# Patient Record
Sex: FEMALE | Race: ASIAN | URBAN - METROPOLITAN AREA
[De-identification: names, ages, dates, MRNs, and addresses within clinical notes are randomized per-mention and may not be internally consistent; named-entity substitution may affect disease eponyms.]

---

## 2017-11-05 NOTE — PROGRESS NOTES
PRE-VISIT PLANNING:     Future Appointments  Date Time Provider Malcom Mosley   2017 10:30 AM Coretta Garcia MD [address] (PCP is Cathi Hull MD) is scheduled for an office visit with Coretta Garcia MD on 2017 for new patient visit. Encounter opened prior to visit to complete pre-visit planning, update and review pertinent sections in the chart. Omid Landers did not return matching chart. Rooming Nurse Items:  -- Release for previous PCP records (HM, immunization records and last office note)  -- Release for OB/GYN records (Pap smear, mammo, DEXA)    Pending items for provider to review with patient:  -- Fasting labs  Health Maintenance Due   Topic Date Due    PAP AKA CERVICAL CYTOLOGY  1989            New Patient Visit - Internal [address] at [facility]  [address] [facility], [facility], [address]  Phone [phone]; Fax [phone]    Date of Service:  2017  Patient's Name: Elena Welch   Patient's :  1970   Patient's Age:  48 y.o. Patient's Gender:  female     Subjective/Discussion:     Chief Complaint   Patient presents with   [address]gur 74 presented as a new patient today. No previous PCP. Has been seeing OB/GYN Dr. Chely Ruiz regularly. C/o RUQ pain in abdominal wall, occurs only with ab workouts, can stretch out and pain will resolve. No change in bowel habits associated with pain. Has mild constipation, but bowels are moving regularly. Prefers natural remedies to medication, but hasn't required any supplements or teas for constipation recently. Does also c/o frequent belching/gas with some abdominal bloating, not associated with any trigger. No nausea, vomiting, diarrhea. No recent antibiotic exposures or illnesses. In good general health. No medical dx, no daily meds.   Occasionally uses PRN Tylenol from [facility] (Panadol brand) and PRN Fexofenadine if she has skin itching or hives (unclear trigger, but she thinks an ingredient in soda/Coca [employer]a is a trigger). Past Medical History reviewed and annotated:   Past Medical History:   Diagnosis Date    Abdominal bloating 11/6/2017    Abdominal wall pain in right upper quadrant 11/6/2017    Constipation 11/6/2017    Hives and itching     Coca [employer]a triggers hives, elastic in clothing will also cause itching - can't tolerate benadryl, uses fexofenadine    Influenza vaccination declined by patient 11/6/2017       REVIEW OF SYSTEMS       Complete review of systems negative except as noted in HPI. Physical Exam:     VS:    Visit Vitals    /60 (BP 1 Location: Right arm, BP Patient Position: Sitting)    Pulse (!) 59    Temp 97.4 °F (36.3 °C) (Oral)    Resp 16    Ht 5' 6\" (1.676 m)    Wt 130 lb (59 kg)    LMP 10/16/2017    SpO2 98%    BMI 20.98 kg/m2        General:   Pleasant, well-appearing female seated comfortably, slender,       Well-nourished, well-groomed, conversant, alert, in no acute distress.      Psych:  Affect pleasnat, appropriate,  interactive, facies and mood are congruent,     behavior and conversation are appropriate, thought process linear and logical     Memory appears to be normal throughout interview  Head:  Normocephalic, atraumatic  Ears:  External ears WNL, no pain with manipulation of pinna or palpation of mastoids;      EACs patent; TMs with no bulging, or erythema, no medial effusions present  Eyes:  EOMI, PERRL, no pain with eye movements    No conjunctival injection, abnormal tearing, discharge, chemosis  Mouth:  MMM without erythema, membranes, exudates, ulcerations or petechiae; posterior o/p WNL  Nose:  External nasal structures WNL, nares patent b/l  Neck:  Neck supple with normal ROM for age    No thyromegaly or nodules, no LAD  Cardiovasc:   Regular rate and rhythm, no murmurs, no rubs, no gallops  Pulmonary:   Clear breath sounds bilaterally, good air movement,    No wheezing, no rales, no rhonchi, normal respiratory effort  Abdomen:   Abdomen soft, nontender, nondistended, NABS, no masses  Extremities:   No pitting dependent edema    No tenderness with palpation of calves    Warm and well-perfused at distal extremities  Neuro:   Alert, conversant, following commands, no focal deficits. Ambulating with narrow based, stable gait without use of assistive device  Skin:    Warm, dry, normal pallor, no rashes noted. Assessment/Plan:        Diagnoses and all orders for this visit:    1. Constipation, unspecified constipation type    2. Abdominal bloating    3. Abdominal wall pain in right upper quadrant    4. Screening for endocrine, metabolic, and immunity disorder  -     CBC WITH AUTOMATED DIFF; Future  -     METABOLIC PANEL, COMPREHENSIVE; Future  -     HEMOGLOBIN A1C WITH EAG; Future  -     LIPID PANEL; Future  -     TSH AND FREE T4; Future  -     URINALYSIS W/ RFLX MICROSCOPIC; Future  -     VITAMIN D, 25 HYDROXY; Future    5. Screening for diabetes mellitus  -     HEMOGLOBIN A1C WITH EAG; Future    6. Screening, lipid  -     LIPID PANEL; Future    7. Screening for thyroid disorder  -     TSH AND FREE T4; Future    8. Screening for blood or protein in urine  -     URINALYSIS W/ RFLX MICROSCOPIC; Future    9. Encounter for vitamin deficiency screening  -     VITAMIN D, 25 HYDROXY; Future    10. Influenza vaccination declined by patient        Sree Valle was seen today as a new patient to establish care. Records from previous providers requested. Fasting labs today. Your right upper abdominal pain sounds muscular - try using a foam roller for 2-3 minutes to relax your abdominal muscles before a workout. If you are still having pain, please call and let me know.     Your bloating and belching may be from a change in your gut cali (the bacteria that live in your intestines and help you digest food). Take a probiotic of your choice for 2 months (once daily) and if not improving, call my office. Call and request an earlier appointment if you have any new questions or concerns. Recommend flu shot and tetanus booster/TDAP, declined by patient    Follow up with Argenis Lam MD yearly for preventive visit (30m). Call and request an earlier appointment for new questions or concerns. The patient states understanding and agrees with the treatment plan. Argenis Lam MD - Internal Medicine  2017, 1:10 PM  [facility]    Patient Care Team:  Yudith Chauhan MD as PCP - General  Anaid Kay MD as Consulting Provider (Obstetrics & Gynecology)     Medications:     Current Outpatient Prescriptions   Medication Sig    ACETAMINOPHEN (PANADOL EXTRA STRENGTH PO) Take  by mouth. As needed    fexofenadine (ALLEGRA) 180 mg tablet Take 180 mg by mouth daily as needed for Itching. No current facility-administered medications for this visit. Additional History:     Past Surgical History:   Procedure Laterality Date    HX  SECTION       Social History     Social History    Marital status:      Spouse name: N/A    Number of children: N/A    Years of education: N/A     Social History Main Topics    Smoking status: Former Smoker    Smokeless tobacco: Never Used    Alcohol use Yes      Comment: rarely    Drug use: No    Sexual activity: Yes     Partners: Male     Other Topics Concern    None     Social History Narrative    2017:  [place], grew up in [place] and moved to the [address] [place] [address] about 7 years ago. Had one child via . Works as a massage therapist/energy healer.        Family History   Problem Relation Age of Onset    Hypertension Father      Allergies   Allergen Reactions    Cephalosporins Anaphylaxis    Codeine Shortness of Breath     Shaking    Pcn [Penicillins] Unknown (comments)  Opioids - Morphine Analogues Anxiety       Problem List:      Patient Active Problem List   Diagnosis Code    Influenza vaccination declined by patient Z28.21    Abdominal wall pain in right upper quadrant R10.11    Abdominal bloating R14.0    Constipation K59.00            This document may have been created with the aid of dictation software. In spite of review and editing, text may contain errors, particularly phonetic errors.

## 2017-11-06 ENCOUNTER — HOSPITAL ENCOUNTER (OUTPATIENT)
Dept: LAB | Age: 47
Discharge: HOME | End: 2017-11-06

## 2017-11-06 ENCOUNTER — OFFICE VISIT (OUTPATIENT)
Dept: FAMILY MEDICINE CLINIC | Age: 47
End: 2017-11-06

## 2017-11-06 VITALS
TEMPERATURE: 97.4 F | BODY MASS INDEX: 20.89 KG/M2 | WEIGHT: 130 LBS | SYSTOLIC BLOOD PRESSURE: 112 MMHG | OXYGEN SATURATION: 98 % | HEIGHT: 66 IN | DIASTOLIC BLOOD PRESSURE: 60 MMHG | RESPIRATION RATE: 16 BRPM | HEART RATE: 59 BPM

## 2017-11-06 DIAGNOSIS — R14.0 ABDOMINAL BLOATING: ICD-10-CM

## 2017-11-06 DIAGNOSIS — K59.00 CONSTIPATION, UNSPECIFIED CONSTIPATION TYPE: Primary | ICD-10-CM

## 2017-11-06 DIAGNOSIS — Z13.29 SCREENING FOR THYROID DISORDER: ICD-10-CM

## 2017-11-06 DIAGNOSIS — Z13.1 SCREENING FOR DIABETES MELLITUS: ICD-10-CM

## 2017-11-06 DIAGNOSIS — Z13.29 SCREENING FOR ENDOCRINE, METABOLIC, AND IMMUNITY DISORDER: ICD-10-CM

## 2017-11-06 DIAGNOSIS — Z13.21 ENCOUNTER FOR VITAMIN DEFICIENCY SCREENING: ICD-10-CM

## 2017-11-06 DIAGNOSIS — Z28.21 INFLUENZA VACCINATION DECLINED BY PATIENT: ICD-10-CM

## 2017-11-06 DIAGNOSIS — Z13.0 SCREENING FOR ENDOCRINE, METABOLIC, AND IMMUNITY DISORDER: ICD-10-CM

## 2017-11-06 DIAGNOSIS — Z13.228 SCREENING FOR ENDOCRINE, METABOLIC, AND IMMUNITY DISORDER: ICD-10-CM

## 2017-11-06 DIAGNOSIS — R10.11 ABDOMINAL WALL PAIN IN RIGHT UPPER QUADRANT: ICD-10-CM

## 2017-11-06 DIAGNOSIS — Z13.89 SCREENING FOR BLOOD OR PROTEIN IN URINE: ICD-10-CM

## 2017-11-06 DIAGNOSIS — Z13.220 SCREENING, LIPID: ICD-10-CM

## 2017-11-06 PROCEDURE — 99001 SPECIMEN HANDLING PT-LAB: CPT | Performed by: INTERNAL MEDICINE

## 2017-11-06 RX ORDER — MINERAL OIL
180 ENEMA (ML) RECTAL
COMMUNITY

## 2017-11-06 NOTE — PATIENT INSTRUCTIONS
No results found for any previous visit. AFTER VISIT INSTRUCTIONS       Fasting labs today. Your right upper abdominal pain sounds muscular - try using a foam roller for 2-3 minutes to relax your abdominal muscles before a workout. If you are still having pain, please call and let me know. Your bloating and belching may be from a change in your gut cali (the bacteria that live in your intestines and help you digest food). Take a probiotic of your choice for 2 months (once daily) and if not improving, call my office. Call and request an earlier appointment if you have any new questions or concerns. LAB HOURS AT [facility]     Monday-Friday 7a-3p  Closed on holidays    TESTING RESULTS     Results of tests performed in this office will be viewable through [employer] in 10-14 days. Please be patient when awaiting routine lab testing results. Testing completed outside of the [employer] [facility] will not be viewable in [employer]. If you need assistance with accessing your [email or web address] account, you can call the [facility] [employer] at #[phone]. STAY UP TO DATE WITH YOUR PREVENTIVE HEALTH   Please review the following recommendations and if you are not sure that your healthcare is up to date, ask your provider at your next scheduled office visit. -- Yearly preventive visits (sometimes called \"physicals\") are recommended for all adults. Medicare and Medicaid do not pay for physicals. Medicare covers a yearly wellness visit that differs in many ways from a traditional physical, but is an opportunity to make sure you are maximizing the preventive services that will be covered by Medicare. Each insurance carrier will have different regulations about what services may be covered, so be sure to talk with your insurance provider before scheduling a visit.      -- Colon cancer screening is recommended for all patients 50 and older with either colonoscopy (interval will vary depending on results) or yearly stool testing.      -- Mammogram is recommended every 2 years for women ages 36 to 76. -- Pap smear is recommended every 3-5 years for women aged 24 to 72, unless your cervix has been surgically removed for benign reasons. -- Flu shot is recommended every fall for adults of all ages. -- Prevnar 15 is recommended at the age of 72 for all adults. -- Pneumovax is recommended one year after Prevnar 13 for all adults, but earlier if you have a history of chronic health problems (including diabetes and asthma) or smoking. -- Tetanus boosters are recommended every 10 years for adults of all ages. Medicare and Medicaid do not cover tetanus as a preventive booster, but will pay for patients to receive a booster in the event of certain injuries. MEDICATION REFILLS      -- Controlled substance refills must be obtained during a scheduled office visit with the provider. -- All other medication refills are to be requested by calling your pharmacy during regular office hours. Allow at least 2 business days for processing. Refills will not be provided by the after hours answering service/on call provider. HOLIDAY CLOSURES:     The office is closed on six major holidays each year:  New Year's Day, July 4th, Memorial Day, Labor Day, Thanksgiving, and [place] Day. Lab and X-ray services are not available on those days.

## 2017-11-06 NOTE — MR AVS SNAPSHOT
Visit Information Date & Time Provider Department Dept. Phone Encounter #  
 11/6/2017 10:30 AM Celena Stephenson MD [employer] [phone] [id number] Upcoming Health Maintenance Date Due DTaP/Tdap/Td series (1 - Tdap) 11/30/1989 PAP AKA CERVICAL CYTOLOGY 11/30/1989 INFLUENZA AGE 9 TO ADULT 8/1/2017 Allergies as of 11/6/2017  Review Complete On: 11/6/2017 By: Celena Stephenson MD  
  
 Severity Noted Reaction Type Reactions Cephalosporins High 11/06/2017    Anaphylaxis Codeine  11/06/2017    Shortness of Breath Shaking Pcn [Penicillins]  11/06/2017    Unknown (comments) Opioids - Morphine Analogues Low 11/06/2017    Anxiety Current Immunizations  Never Reviewed No immunizations on file. Not reviewed this visit You Were Diagnosed With   
  
 Codes Comments Constipation, unspecified constipation type    -  Primary ICD-10-CM: K59.00 ICD-9-CM: 564.00 Abdominal bloating     ICD-10-CM: R14.0 ICD-9-CM: 219. 3 Right upper quadrant abdominal pain     ICD-10-CM: R10.11 ICD-9-CM: 789.01 Screening for endocrine, metabolic, and immunity disorder     ICD-10-CM: Z13.29, Z13.228, Z13.0 ICD-9-CM: V77.99 Screening for diabetes mellitus     ICD-10-CM: Z13.1 ICD-9-CM: V77.1 Screening, lipid     ICD-10-CM: C77.549 ICD-9-CM: V77.91 Screening for thyroid disorder     ICD-10-CM: Z13.29 ICD-9-CM: V77.0 Screening for blood or protein in urine     ICD-10-CM: Z13.89 ICD-9-CM: V82.9 Encounter for vitamin deficiency screening     ICD-10-CM: Z13.21 ICD-9-CM: V77.99 Vitals BP Pulse Temp Resp Height(growth percentile) Weight(growth percentile) 112/60 (BP 1 Location: Right arm, BP Patient Position: Sitting) (!) 59 97.4 °F (36.3 °C) (Oral) 16 5' 6\" (1.676 m) 130 lb (59 kg) LMP SpO2 BMI OB Status Smoking Status 10/16/2017 98% 20.98 kg/m2 Having regular periods Former Smoker Vitals History BMI and BSA Data Body Mass Index Body Surface Area  
 20.98 kg/m 2 1.66 m 2 Your Updated Medication List  
  
   
This list is accurate as of: 11/6/17 11:12 AM.  Always use your most recent med list.  
  
  
  
  
 fexofenadine 180 mg tablet Commonly known as:  Wolfgang Flax Take 180 mg by mouth daily as needed for Itching. PANADOL EXTRA STRENGTH PO Take  by mouth. As needed To-Do List   
 11/06/2017 Lab:  CBC WITH AUTOMATED DIFF   
  
 11/06/2017 Lab:  HEMOGLOBIN A1C WITH EAG   
  
 11/06/2017 Lab:  LIPID PANEL   
  
 11/06/2017 Lab:  METABOLIC PANEL, COMPREHENSIVE   
  
 11/06/2017 Lab:  TSH AND FREE T4   
  
 11/06/2017 Lab:  URINALYSIS W/ RFLX MICROSCOPIC   
  
 11/06/2017 Lab:  VITAMIN D, 25 HYDROXY Patient Instructions No results found for any previous visit. AFTER VISIT INSTRUCTIONS Fasting labs today. Your right upper abdominal pain sounds muscular - try using a foam roller for 2-3 minutes to relax your abdominal muscles before a workout. If you are still having pain, please call and let me know. Your bloating and belching may be from a change in your gut cali (the bacteria that live in your intestines and help you digest food). Take a probiotic of your choice for 2 months (once daily) and if not improving, call my office. Call and request an earlier appointment if you have any new questions or concerns. LAB HOURS AT [facility] Monday-Friday 7a-3p Closed on holidays TESTING RESULTS Results of tests performed in this office will be viewable through [facility] in 10-14 days. Please be patient when awaiting routine lab testing results. Testing completed outside of the [employer] [facility] [facility] will not be viewable in [facility]. If you need assistance with accessing your [employer] account, you can call the [address] [place] [address] at #[phone]. STAY UP  [address] Please review the following recommendations and if you are not sure that your healthcare is up to date, ask your provider at your next scheduled office visit. -- Yearly preventive visits (sometimes called \"physicals\") are recommended for all adults. Medicare and Medicaid do not pay for physicals. Medicare covers a yearly wellness visit that differs in many ways from a traditional physical, but is an opportunity to make sure you are maximizing the preventive services that will be covered by Medicare. Each insurance carrier will have different regulations about what services may be covered, so be sure to talk with your insurance provider before scheduling a visit. -- Colon cancer screening is recommended for all patients 48 and older with either colonoscopy (interval will vary depending on results) or yearly stool testing.   
 
-- Mammogram is recommended every 2 years for women ages 36 to 76. -- Pap smear is recommended every 3-5 years for women aged 24 to 72, unless your cervix has been surgically removed for benign reasons. -- Flu shot is recommended every fall for adults of all ages. -- Prevnar 15 is recommended at the age of 72 for all adults. -- Pneumovax is recommended one year after Prevnar 13 for all adults, but earlier if you have a history of chronic health problems (including diabetes and asthma) or smoking. -- Tetanus boosters are recommended every 10 years for adults of all ages. Medicare and Medicaid do not cover tetanus as a preventive booster, but will pay for patients to receive a booster in the event of certain injuries. MEDICATION REFILLS   
 
-- Controlled substance refills must be obtained during a scheduled office visit with the provider. -- All other medication refills are to be requested by calling your pharmacy during regular office hours.   Allow at least 2 business days for processing. Refills will not be provided by the after hours answering service/on call provider. HOLIDAY CLOSURES:  
 
The office is closed on six major holidays each year:  New Year's Day, July 4th, Memorial Day, Labor Day, Thanksgiving, and [place] Day. Lab and X-ray services are not available on those days. Introducing [facility]! [employer] introduces [employer] patient portal. Now you can access parts of your medical record, email your doctor's office, and request medication refills online. 1. In your internet browser, go to [email or web address]://[employer]. [employer] 2. Click on the First Time User? Click Here link in the Sign In box. You will see the New Member Sign Up page. 3. Enter your [employer] Access Code exactly as it appears below. You will not need to use this code after youve completed the sign-up process. If you do not sign up before the expiration date, you must request a new code. · [employer] Access Code: [email or web address] Expires: 2/4/2018 11:12 AM 
 
4. Enter the last four digits of your Social Security Number (xxxx) and Date of Birth (mm/dd/yyyy) as indicated and click Submit. You will be taken to the next sign-up page. 5. Create a [employer] ID. This will be your [employer] login ID and cannot be changed, so think of one that is secure and easy to remember. 6. Create a [employer] password. You can change your password at any time. 7. Enter your Password Reset Question and Answer. This can be used at a later time if you forget your password. 8. Enter your e-mail address. You will receive e-mail notification when new information is available in [address] 9. Click Sign Up. You can now view and download portions of your medical record. 10. Click the Download Summary menu link to download a portable copy of your medical information.  
 
If you have questions, please visit the Frequently Asked Questions section of the [employer]. Remember, [employer] is NOT to be used for urgent needs. For medical emergencies, dial 911. Now available from your iPhone and Android! Please provide this summary of care documentation to your next provider. Your primary care clinician is listed as Amy Carrillo. If you have any questions after today's visit, please call [phone].

## 2017-11-06 NOTE — PROGRESS NOTES
Albaro Myers is a 48 y.o. female (: 1970) presenting to address:    Chief Complaint   Patient presents with    Establish Care       Vitals:    17 1034   BP: 112/60   Pulse: (!) 59   Resp: 16   Temp: 97.4 °F (36.3 °C)   TempSrc: Oral   SpO2: 98%   Weight: 130 lb (59 kg)   Height: 5' 6\" (1.676 m)   PainSc:   0 - No pain   LMP: 10/16/2017       Learning Assessment:     Learning Assessment 2017   PRIMARY LEARNER Patient   HIGHEST LEVEL OF EDUCATION - PRIMARY LEARNER  SOME COLLEGE   BARRIERS PRIMARY LEARNER NONE   CO-LEARNER CAREGIVER No   PRIMARY LANGUAGE ENGLISH    NEED No   LEARNER PREFERENCE PRIMARY READING   LEARNING SPECIAL TOPICS none   ANSWERED BY patient   RELATIONSHIP SELF   ASSESSMENT COMMENT n/a     Depression Screening:     PHQ over the last two weeks 2017   Little interest or pleasure in doing things Not at all   Feeling down, depressed or hopeless Not at all   Total Score PHQ 2 0     Fall Risk Assessment:     Fall Risk Assessment, last 12 mths 2017   Able to walk? Yes   Fall in past 12 months? No     Abuse Screening:     Abuse Screening Questionnaire 2017   Do you ever feel afraid of your partner? N   Are you in a relationship with someone who physically or mentally threatens you? N   Is it safe for you to go home? Y       Advanced Directive:   1. Do you have an Advanced Directive? NO    2. Would you like information on Advanced Directives?  YES

## 2017-11-07 DIAGNOSIS — E55.9 VITAMIN D INSUFFICIENCY: Primary | ICD-10-CM

## 2017-11-07 DIAGNOSIS — E78.1 HYPERTRIGLYCERIDEMIA: ICD-10-CM

## 2017-11-07 LAB
25(OH)D3+25(OH)D2 SERPL-MCNC: 29.7 NG/ML (ref 30–100)
ALBUMIN SERPL-MCNC: 4.5 G/DL (ref 3.5–5.5)
ALBUMIN/GLOB SERPL: 1.4 {RATIO} (ref 1.2–2.2)
ALP SERPL-CCNC: 52 IU/L (ref 39–117)
ALT SERPL-CCNC: 27 IU/L (ref 0–32)
APPEARANCE UR: CLEAR
AST SERPL-CCNC: 20 IU/L (ref 0–40)
BASOPHILS # BLD AUTO: 0 X10E3/UL (ref 0–0.2)
BASOPHILS NFR BLD AUTO: 0 %
BILIRUB SERPL-MCNC: 0.7 MG/DL (ref 0–1.2)
BILIRUB UR QL STRIP: NEGATIVE
BUN SERPL-MCNC: 13 MG/DL (ref 6–24)
BUN/CREAT SERPL: 19 (ref 9–23)
CALCIUM SERPL-MCNC: 9.4 MG/DL (ref 8.7–10.2)
CHLORIDE SERPL-SCNC: 95 MMOL/L (ref 96–106)
CHOLEST SERPL-MCNC: 210 MG/DL (ref 100–199)
CO2 SERPL-SCNC: 24 MMOL/L (ref 18–29)
COLOR UR: YELLOW
CREAT SERPL-MCNC: 0.68 MG/DL (ref 0.57–1)
EOSINOPHIL # BLD AUTO: 0.1 X10E3/UL (ref 0–0.4)
EOSINOPHIL NFR BLD AUTO: 2 %
ERYTHROCYTE [DISTWIDTH] IN BLOOD BY AUTOMATED COUNT: 13.5 % (ref 12.3–15.4)
EST. AVERAGE GLUCOSE BLD GHB EST-MCNC: 111 MG/DL
GFR SERPLBLD CREATININE-BSD FMLA CKD-EPI: 104 ML/MIN/1.73
GFR SERPLBLD CREATININE-BSD FMLA CKD-EPI: 120 ML/MIN/1.73
GLOBULIN SER CALC-MCNC: 3.2 G/DL (ref 1.5–4.5)
GLUCOSE SERPL-MCNC: 93 MG/DL (ref 65–99)
GLUCOSE UR QL: NEGATIVE
HBA1C MFR BLD: 5.5 % (ref 4.8–5.6)
HCT VFR BLD AUTO: 41.3 % (ref 34–46.6)
HDLC SERPL-MCNC: 39 MG/DL
HGB BLD-MCNC: 13.6 G/DL (ref 11.1–15.9)
HGB UR QL STRIP: NEGATIVE
IMM GRANULOCYTES # BLD: 0 X10E3/UL (ref 0–0.1)
IMM GRANULOCYTES NFR BLD: 0 %
INTERPRETATION, 910389: NORMAL
KETONES UR QL STRIP: NEGATIVE
LDLC SERPL CALC-MCNC: ABNORMAL MG/DL (ref 0–99)
LEUKOCYTE ESTERASE UR QL STRIP: NEGATIVE
LYMPHOCYTES # BLD AUTO: 1.2 X10E3/UL (ref 0.7–3.1)
LYMPHOCYTES NFR BLD AUTO: 25 %
MCH RBC QN AUTO: 30 PG (ref 26.6–33)
MCHC RBC AUTO-ENTMCNC: 32.9 G/DL (ref 31.5–35.7)
MCV RBC AUTO: 91 FL (ref 79–97)
MICRO URNS: NORMAL
MONOCYTES # BLD AUTO: 0.4 X10E3/UL (ref 0.1–0.9)
MONOCYTES NFR BLD AUTO: 8 %
NEUTROPHILS # BLD AUTO: 3.2 X10E3/UL (ref 1.4–7)
NEUTROPHILS NFR BLD AUTO: 65 %
NITRITE UR QL STRIP: NEGATIVE
PDF IMAGE, 910387: NORMAL
PH UR STRIP: 5.5 [PH] (ref 5–7.5)
PLATELET # BLD AUTO: 212 X10E3/UL (ref 150–379)
POTASSIUM SERPL-SCNC: 4.3 MMOL/L (ref 3.5–5.2)
PROT SERPL-MCNC: 7.7 G/DL (ref 6–8.5)
PROT UR QL STRIP: NEGATIVE
RBC # BLD AUTO: 4.53 X10E6/UL (ref 3.77–5.28)
SODIUM SERPL-SCNC: 138 MMOL/L (ref 134–144)
SP GR UR: 1.01 (ref 1–1.03)
T4 FREE SERPL-MCNC: 1.1 NG/DL (ref 0.82–1.77)
TRIGL SERPL-MCNC: 513 MG/DL (ref 0–149)
TSH SERPL DL<=0.005 MIU/L-ACNC: 3.72 UIU/ML (ref 0.45–4.5)
UROBILINOGEN UR STRIP-MCNC: 0.2 MG/DL (ref 0.2–1)
VLDLC SERPL CALC-MCNC: ABNORMAL MG/DL (ref 5–40)
WBC # BLD AUTO: 4.9 X10E3/UL (ref 3.4–10.8)

## 2017-11-07 RX ORDER — ACETAMINOPHEN 500 MG
2000 TABLET ORAL DAILY
Qty: 365 CAP | Refills: 99 | COMMUNITY

## 2017-11-08 NOTE — PROGRESS NOTES
Lora Covarrubias  is not active on [employer]. Please contact pt with results and offer to mail a copy of results letter. Vitamin D level is slightly low - recommend over the counter vitamin D3 2000 units per day (maintenance/long term)  One of your cholesterol levels was very high, making the calculation of some of the other levels invalid. Please stop by the lab for additional fasting blood draw at your next earliest convenience. Therapy/treatment recommendations depend on additional testing results. Lab orders have been placed. Remainder of labs are good - no diabetes, thyroid testing is normal, kidney and liver testing are normal, no anemia.